# Patient Record
Sex: FEMALE | Race: WHITE | Employment: OTHER | URBAN - METROPOLITAN AREA
[De-identification: names, ages, dates, MRNs, and addresses within clinical notes are randomized per-mention and may not be internally consistent; named-entity substitution may affect disease eponyms.]

---

## 2017-01-23 PROBLEM — E66.01 MORBID OBESITY WITH BMI OF 40.0-44.9, ADULT (HCC): Status: ACTIVE | Noted: 2017-01-23

## 2017-02-03 PROBLEM — M21.379 FOOT DROP, UNSPECIFIED FOOT: Status: ACTIVE | Noted: 2017-02-03

## 2017-04-21 PROCEDURE — 83970 ASSAY OF PARATHORMONE: CPT | Performed by: INTERNAL MEDICINE

## 2017-04-24 ENCOUNTER — LAB ENCOUNTER (OUTPATIENT)
Dept: LAB | Facility: HOSPITAL | Age: 67
End: 2017-04-24
Attending: UROLOGY
Payer: MEDICARE

## 2017-04-24 DIAGNOSIS — R32 UNSPECIFIED URINARY INCONTINENCE: Primary | ICD-10-CM

## 2017-04-24 PROCEDURE — 81001 URINALYSIS AUTO W/SCOPE: CPT | Performed by: UROLOGY

## 2017-04-24 PROCEDURE — 87086 URINE CULTURE/COLONY COUNT: CPT

## 2017-04-24 PROCEDURE — 87088 URINE BACTERIA CULTURE: CPT

## 2017-04-24 PROCEDURE — 87186 SC STD MICRODIL/AGAR DIL: CPT

## 2017-06-05 PROCEDURE — 87086 URINE CULTURE/COLONY COUNT: CPT | Performed by: UROLOGY

## 2018-04-12 PROBLEM — D75.839 THROMBOCYTOSIS: Status: ACTIVE | Noted: 2018-04-12

## 2018-04-12 PROBLEM — I70.0 ATHEROSCLEROSIS OF AORTA: Status: ACTIVE | Noted: 2018-04-12

## 2018-04-12 PROBLEM — I77.1 TORTUOUS AORTA: Status: ACTIVE | Noted: 2018-04-12

## 2018-04-12 PROBLEM — E21.0 PRIMARY HYPERPARATHYROIDISM (HCC): Status: ACTIVE | Noted: 2018-04-12

## 2018-04-12 PROBLEM — I77.1 TORTUOUS AORTA (HCC): Status: ACTIVE | Noted: 2018-04-12

## 2018-04-12 PROBLEM — I70.0 ATHEROSCLEROSIS OF AORTA (HCC): Status: ACTIVE | Noted: 2018-04-12

## 2018-07-09 PROCEDURE — 81001 URINALYSIS AUTO W/SCOPE: CPT | Performed by: PHYSICIAN ASSISTANT

## 2018-07-09 PROCEDURE — 87086 URINE CULTURE/COLONY COUNT: CPT | Performed by: PHYSICIAN ASSISTANT

## 2018-08-20 PROCEDURE — 87086 URINE CULTURE/COLONY COUNT: CPT | Performed by: UROLOGY

## 2019-01-03 PROCEDURE — 88175 CYTOPATH C/V AUTO FLUID REDO: CPT | Performed by: INTERNAL MEDICINE

## 2019-01-07 PROCEDURE — 87077 CULTURE AEROBIC IDENTIFY: CPT | Performed by: PHYSICIAN ASSISTANT

## 2019-01-07 PROCEDURE — 81001 URINALYSIS AUTO W/SCOPE: CPT | Performed by: PHYSICIAN ASSISTANT

## 2019-01-07 PROCEDURE — 87086 URINE CULTURE/COLONY COUNT: CPT | Performed by: PHYSICIAN ASSISTANT

## 2019-01-07 PROCEDURE — 87186 SC STD MICRODIL/AGAR DIL: CPT | Performed by: PHYSICIAN ASSISTANT

## 2019-01-08 PROBLEM — E21.0 PRIMARY HYPERPARATHYROIDISM (HCC): Status: RESOLVED | Noted: 2018-04-12 | Resolved: 2019-01-08

## 2019-01-25 PROCEDURE — 81001 URINALYSIS AUTO W/SCOPE: CPT | Performed by: PHYSICIAN ASSISTANT

## 2019-03-15 PROCEDURE — 81001 URINALYSIS AUTO W/SCOPE: CPT | Performed by: UROLOGY

## 2019-03-28 PROCEDURE — 87086 URINE CULTURE/COLONY COUNT: CPT | Performed by: INTERNAL MEDICINE

## 2019-04-24 PROCEDURE — 81015 MICROSCOPIC EXAM OF URINE: CPT | Performed by: PHYSICIAN ASSISTANT

## 2019-06-12 PROCEDURE — 87077 CULTURE AEROBIC IDENTIFY: CPT | Performed by: UROLOGY

## 2019-06-12 PROCEDURE — 87186 SC STD MICRODIL/AGAR DIL: CPT | Performed by: UROLOGY

## 2019-06-12 PROCEDURE — 81015 MICROSCOPIC EXAM OF URINE: CPT | Performed by: UROLOGY

## 2019-06-12 PROCEDURE — 87086 URINE CULTURE/COLONY COUNT: CPT | Performed by: UROLOGY

## 2019-07-08 PROCEDURE — 87086 URINE CULTURE/COLONY COUNT: CPT | Performed by: UROLOGY

## 2019-07-08 PROCEDURE — 87186 SC STD MICRODIL/AGAR DIL: CPT | Performed by: UROLOGY

## 2019-07-08 PROCEDURE — 87077 CULTURE AEROBIC IDENTIFY: CPT | Performed by: UROLOGY

## 2019-07-08 PROCEDURE — 81001 URINALYSIS AUTO W/SCOPE: CPT | Performed by: UROLOGY

## 2019-07-23 PROCEDURE — 81001 URINALYSIS AUTO W/SCOPE: CPT | Performed by: UROLOGY

## 2019-07-23 PROCEDURE — 87086 URINE CULTURE/COLONY COUNT: CPT | Performed by: UROLOGY

## 2019-07-23 PROCEDURE — 87186 SC STD MICRODIL/AGAR DIL: CPT | Performed by: UROLOGY

## 2019-07-23 PROCEDURE — 87077 CULTURE AEROBIC IDENTIFY: CPT | Performed by: UROLOGY

## 2019-09-19 ENCOUNTER — ORDER TRANSCRIPTION (OUTPATIENT)
Dept: ADMINISTRATIVE | Facility: HOSPITAL | Age: 69
End: 2019-09-19

## 2019-09-19 DIAGNOSIS — K50.00 CROHN'S DISEASE OF SMALL INTESTINE WITHOUT COMPLICATION (HCC): Primary | ICD-10-CM

## 2019-09-23 ENCOUNTER — OFFICE VISIT (OUTPATIENT)
Dept: NUTRITION | Age: 69
End: 2019-09-23
Attending: INTERNAL MEDICINE
Payer: MEDICARE

## 2019-09-23 PROCEDURE — 97802 MEDICAL NUTRITION INDIV IN: CPT

## 2019-09-24 NOTE — PROGRESS NOTES
ADULT INITIAL OUTPATIENT NUTRITION CONSULTATION    Nutrition Assessment    Medical Diagnosis: Obesity, IBS    PMH: s/p lap band 2007, Crohn's Disease (controlled), HTN    Physical Findings: pleasant 69 y/o female, walks w/cane     Meds: noted    Labs: n/a via kendell MFP. Encouraged pt to monitor macros and calories as well, to determine daily needs. Pt aware of need to increase activity. Discussed following a strict FODMAP diet for 6 weeks, continuing to journal BMs and emotions.  Provided pt with list of foods

## 2019-10-07 ENCOUNTER — OFFICE VISIT (OUTPATIENT)
Dept: NUTRITION | Age: 69
End: 2019-10-07
Attending: INTERNAL MEDICINE
Payer: MEDICARE

## 2019-10-07 PROCEDURE — 97802 MEDICAL NUTRITION INDIV IN: CPT

## 2019-10-08 NOTE — PROGRESS NOTES
ADULT OUTPATIENT NUTRITION FOLLOW UP      Nutrition Assessment    Number of consultations with dietitian: 2nd visit    Ht:5'4\"  Wt: 265 lbs  Weight hx: 9/24/#   Weight change: LOSS of 6 lbs over 2 weeks    Estimated nutritional Needs: 5189-3722 kcal

## 2019-10-15 PROCEDURE — 88173 CYTOPATH EVAL FNA REPORT: CPT | Performed by: INTERNAL MEDICINE

## 2019-11-04 ENCOUNTER — OFFICE VISIT (OUTPATIENT)
Dept: NUTRITION | Age: 69
End: 2019-11-04
Attending: INTERNAL MEDICINE
Payer: MEDICARE

## 2019-11-04 PROCEDURE — 97802 MEDICAL NUTRITION INDIV IN: CPT

## 2019-11-04 NOTE — PROGRESS NOTES
ADULT OUTPATIENT NUTRITION FOLLOW UP      Nutrition Assessment     Number of consultations with dietitian: 3rd visit     Ht:5'4\"              Wt: 261 lbs  Weight hx: 9/24/#           10/7/#       *Weight change: LOSS of 10 lbs in 6 weeks*    contact RD with further questions or concerns.      GOALS:  1-continue to increase fiber: 25-30 gm/dy, lower sodium intake to <2000 mg/dy  2-continue FODMAP diet x 4 weeks  3-activity as able (balance exercises at home)  4-hypnotherapy to aid w/stress and

## 2020-01-06 ENCOUNTER — OFFICE VISIT (OUTPATIENT)
Dept: NUTRITION | Age: 70
End: 2020-01-06
Attending: INTERNAL MEDICINE
Payer: MEDICARE

## 2020-01-06 PROCEDURE — 97802 MEDICAL NUTRITION INDIV IN: CPT

## 2020-01-06 NOTE — PROGRESS NOTES
ADULT OUTPATIENT NUTRITION FOLLOW UP        Nutrition Assessment     Number of consultations with dietitian: 4th visit     Ht:5'4\"              Wt: 255 lbs    Weight hx: 9/24/#                      10/7/#         11/4/#      *Weight rosas 1200-1300k/dy. Activity encouraged. Pt agrees to continue to log intake/feelings/BMs, and follow FODMAP diet x 4 more weeks. Written materials reviewed, including web sites and recipes options. All questions answered today.  Pt to contact RD with further qu

## 2020-06-15 ENCOUNTER — APPOINTMENT (OUTPATIENT)
Dept: NUTRITION | Age: 70
End: 2020-06-15
Attending: NURSE PRACTITIONER
Payer: MEDICARE

## 2020-06-15 ENCOUNTER — TELEMEDICINE (OUTPATIENT)
Dept: NUTRITION | Age: 70
End: 2020-06-15
Payer: MEDICARE

## 2020-06-15 PROCEDURE — 97802 MEDICAL NUTRITION INDIV IN: CPT

## 2020-06-15 NOTE — PROGRESS NOTES
ADULT OUTPATIENT NUTRITION FOLLOW UP    *consultation completed via virtual video visit d/t COVID pandemic*    Nutrition Assessment    Number of consultations with dietitian: 5th visit    Ht: 5'4\"  Wt: 275 lbs    Weight Hx: 9/24/19 - 271#        10/7/19 - stress/anxiety, has tapes for home and sees Sharon Jay as needed. Discussed nutrient dense foods, increasing fiber gradually, monitoring protein and carb intake as well, and keeping herself accountable with MFP.  Pt has all written materials, and agrees to [employer]

## 2020-07-16 PROBLEM — E21.0 PRIMARY HYPERPARATHYROIDISM (HCC): Status: RESOLVED | Noted: 2018-04-12 | Resolved: 2020-07-16

## 2020-07-20 ENCOUNTER — WALK IN (OUTPATIENT)
Dept: URGENT CARE | Age: 70
End: 2020-07-20

## 2020-07-20 VITALS — TEMPERATURE: 99.1 F

## 2020-07-20 DIAGNOSIS — Z23 ENCOUNTER FOR IMMUNIZATION: Primary | ICD-10-CM

## 2020-07-20 PROCEDURE — 90471 IMMUNIZATION ADMIN: CPT | Performed by: NURSE PRACTITIONER

## 2020-07-20 PROCEDURE — 90750 HZV VACC RECOMBINANT IM: CPT | Performed by: NURSE PRACTITIONER

## 2020-07-20 RX ORDER — AZATHIOPRINE 50 MG/1
TABLET ORAL
COMMUNITY
Start: 2016-03-15

## 2020-07-20 RX ORDER — ACETAMINOPHEN 500 MG
500 TABLET ORAL
COMMUNITY

## 2020-07-20 RX ORDER — MULTIVITAMIN
1 TABLET ORAL
COMMUNITY

## 2020-07-20 RX ORDER — VALSARTAN 160 MG/1
TABLET ORAL
COMMUNITY
Start: 2020-07-16 | End: 2021-08-22 | Stop reason: ALTCHOICE

## 2020-07-20 RX ORDER — ALPRAZOLAM 0.25 MG/1
TABLET ORAL
COMMUNITY
Start: 2020-03-03

## 2020-07-20 RX ORDER — ACETAMINOPHEN 160 MG
2000 TABLET,DISINTEGRATING ORAL
COMMUNITY

## 2020-07-20 RX ORDER — PANTOPRAZOLE SODIUM 40 MG/1
TABLET, DELAYED RELEASE ORAL
COMMUNITY
Start: 2016-02-15

## 2020-07-20 RX ORDER — SOLIFENACIN SUCCINATE 10 MG/1
10 TABLET, FILM COATED ORAL
COMMUNITY
Start: 2020-02-14 | End: 2021-02-08

## 2020-07-30 ENCOUNTER — OFFICE VISIT (OUTPATIENT)
Dept: NUTRITION | Facility: HOSPITAL | Age: 70
End: 2020-07-30
Attending: INTERNAL MEDICINE
Payer: MEDICARE

## 2020-07-30 PROCEDURE — 97802 MEDICAL NUTRITION INDIV IN: CPT

## 2020-07-30 NOTE — PROGRESS NOTES
ADULT OUTPATIENT NUTRITION FOLLOW UP     Nutrition Assessment     Number of consultations with dietitian: 6th visit (today in person in [facility] office)     Ht: 5'4\"                         Wt: 277 lbs  BMI: 46     Weight Hx: 9/24/19 - 271# weight loss management, FODMAP diet guidelines and GI issue improvements. Symptoms have greatly reduced over past few months and pt has been more aggressive in trying new FODMAP food items.  Encouraged small amounts over 3 days with the 3 day washout period

## 2020-09-08 ENCOUNTER — TELEPHONE (OUTPATIENT)
Dept: SCHEDULING | Age: 70
End: 2020-09-08

## 2020-09-23 PROBLEM — N32.81 OAB (OVERACTIVE BLADDER): Status: ACTIVE | Noted: 2020-09-23

## 2020-10-05 ENCOUNTER — WALK IN (OUTPATIENT)
Dept: URGENT CARE | Age: 70
End: 2020-10-05

## 2020-10-05 VITALS — TEMPERATURE: 96.9 F

## 2020-10-05 DIAGNOSIS — Z23 ENCOUNTER FOR IMMUNIZATION: Primary | ICD-10-CM

## 2020-10-05 PROCEDURE — 90750 HZV VACC RECOMBINANT IM: CPT | Performed by: NURSE PRACTITIONER

## 2020-10-06 ENCOUNTER — TELEPHONE (OUTPATIENT)
Dept: SCHEDULING | Age: 70
End: 2020-10-06

## 2020-10-26 ENCOUNTER — TELEMEDICINE (OUTPATIENT)
Dept: NUTRITION | Age: 70
End: 2020-10-26
Payer: MEDICARE

## 2020-10-26 PROCEDURE — 97802 MEDICAL NUTRITION INDIV IN: CPT

## 2020-10-26 NOTE — PROGRESS NOTES
ADULT OUTPATIENT NUTRITION FOLLOW UP     Nutrition Assessment (completed via virtual visit)     Number of consultations with dietitian: 7th visit over past year     Ht: 5'4\"                         Wt: 278 lbs                    BMI: 47.7     Weight Hx: 9 new FODMAP food items. Encouraged small amounts over 3 days with the 3 day washout period, which pt has been following (strawberries, pineapple, melon, popcorn, beets, celery, cucumbers, spinach, ice cream, LF yogurt-doesn't like).  Pt plans to try sweet po EMRE  [facility]. [email or web address]  P: [phone]

## 2020-11-30 ENCOUNTER — TELEMEDICINE (OUTPATIENT)
Dept: NUTRITION | Age: 70
End: 2020-11-30
Payer: MEDICARE

## 2020-11-30 PROCEDURE — 97802 MEDICAL NUTRITION INDIV IN: CPT

## 2020-12-01 NOTE — PROGRESS NOTES
ADULT OUTPATIENT NUTRITION FOLLOW UP     Nutrition Assessment (completed via virtual visit)     Number of consultations with dietitian: 8th visit over past year     Ht: 5'4\"                         Wt: 278 lbs                    BMI: 47.7     Weight Hx: 9 cardio/wk. GOAL: 3 classes/wk complete before lunch (22 min or 45 min/dy), arm bands or weights on non-class days, hoping for PT in the future (pending COVID pandemic).     Diet Quality: Needs further improvement     Nutrition Intervention/Education: Compr MFP/symptom journal  3. Stress/anxiety self care and hypnotherapy  4. FODMAP diet continues, close portion control  5. increase water consumption/add high fiber/high protein breakfast daily     Monitoring/Evaluation: Pt to follow with MD, monitor labs, kali

## 2020-12-28 ENCOUNTER — TELEMEDICINE (OUTPATIENT)
Dept: NUTRITION | Age: 70
End: 2020-12-28
Payer: MEDICARE

## 2020-12-28 PROCEDURE — 97802 MEDICAL NUTRITION INDIV IN: CPT

## 2020-12-28 NOTE — PROGRESS NOTES
ADULT OUTPATIENT NUTRITION FOLLOW UP     Nutrition Assessment (completed via virtual visit)     Number of consultations with dietitian: 9th visit over past year     Ht: 5'4\"                         Wt: 278 lbs (no new wt today)                   BMI: 52. 7 consistent with the classes, but does enjoy them. Long term goal 5-6 x/wk. Encouraged steps to goal of 150 min of cardio/wk.  Even walking around the house, using PT bands and weights at home, etc.   GOAL: 3 classes/wk complete before lunch (22 min or 45 mi over the next several visits. Provided handouts on re-introduction guidelines for mannitol and lactose group as discussed. All questions answered at this time. Will add other groups as able/tolerated.  Provided further encouragement and support.        Esteban Beckett

## 2021-01-25 ENCOUNTER — TELEMEDICINE (OUTPATIENT)
Dept: NUTRITION | Age: 71
End: 2021-01-25
Attending: INTERNAL MEDICINE
Payer: MEDICARE

## 2021-01-25 PROCEDURE — 97802 MEDICAL NUTRITION INDIV IN: CPT

## 2021-01-25 NOTE — PROGRESS NOTES
ADULT OUTPATIENT NUTRITION FOLLOW UP     Nutrition Assessment (completed via virtual visit)     Number of consultations with dietitian: 10th visit over past year     Ht: 5'4\"                         Wt: 276 lbs                  BMI: 47.4  *LOSS of 1.5# ov on non-class days, hoping for PT in the future (pending COVID pandemic). RD feels PT will greatly help with balance, strength and confidence in the future.      Diet Quality: Needs further improvement     Nutrition Intervention/Education: Comprehensive nutr consumption/add high fiber/high protein breakfast daily     Monitoring/Evaluation: Pt to follow with MD next week, monitor labs, weight, meds, GI symptoms. Pt to follow with RD in 4-6 weeks, pt will call.      Thank you for the referral,     Patrica Dawson,

## 2021-03-18 PROBLEM — Z79.899 IMMUNODEFICIENCY DUE TO LONG TERM DRUG THERAPY (HCC): Status: ACTIVE | Noted: 2021-03-18

## 2021-03-18 PROBLEM — D84.821 IMMUNODEFICIENCY DUE TO LONG TERM DRUG THERAPY (HCC): Status: ACTIVE | Noted: 2021-03-18

## 2021-05-24 ENCOUNTER — DIETICIAN VISIT (OUTPATIENT)
Dept: NUTRITION | Age: 71
End: 2021-05-24
Attending: INTERNAL MEDICINE
Payer: MEDICARE

## 2021-05-24 PROCEDURE — 97802 MEDICAL NUTRITION INDIV IN: CPT

## 2021-05-25 NOTE — PROGRESS NOTES
ADULT OUTPATIENT NUTRITION FOLLOW UP    Nutrition Assessment    Number of consultations with dietitian: 11th visit over past year    Ht: 5'4\"  Wt: 281# BMI: 48.2 ADJ BW: 84kg  Wt has been stable past several months ~270-280#    Additional Labs since last to begin re-introducing more foods, as last visit with RD was 4 months ago. Pt reports feeling well today, having NO GI distress. \"A fresh start, the COVID winter was difficult\" Pt motivated to begin with new food trials moving forward.  Encouraged nutrie

## 2021-07-26 ENCOUNTER — OFFICE VISIT (OUTPATIENT)
Dept: NUTRITION | Age: 71
End: 2021-07-26
Attending: INTERNAL MEDICINE
Payer: MEDICARE

## 2021-07-26 PROCEDURE — 97802 MEDICAL NUTRITION INDIV IN: CPT

## 2021-07-26 NOTE — PROGRESS NOTES
ADULT OUTPATIENT NUTRITION FOLLOW UP    Progress Notes  Nonda JEREMY Roberts (Registered Dietitian) • • Dietitian     ADULT OUTPATIENT NUTRITION FOLLOW UP     Nutrition Assessment     Number of consultations with dietitian: 12th visit over past year     Ht: 5 months as discussed. Nutrition Intervention/Education: Comprehensive nutrition follow up education provided for weight loss management, FODMAP diet guidelines, and GI issue improvements. Pt reports feeling well today, having NO GI distress.  But is disa

## 2021-08-16 ENCOUNTER — OFFICE VISIT (OUTPATIENT)
Dept: FAMILY MEDICINE CLINIC | Facility: CLINIC | Age: 71
End: 2021-08-16
Payer: COMMERCIAL

## 2021-08-16 VITALS
SYSTOLIC BLOOD PRESSURE: 136 MMHG | TEMPERATURE: 100 F | OXYGEN SATURATION: 95 % | DIASTOLIC BLOOD PRESSURE: 90 MMHG | HEART RATE: 78 BPM | WEIGHT: 287 LBS | RESPIRATION RATE: 16 BRPM | HEIGHT: 64 IN | BODY MASS INDEX: 49 KG/M2

## 2021-08-16 DIAGNOSIS — Z20.822 SUSPECTED 2019 NOVEL CORONAVIRUS INFECTION: Primary | ICD-10-CM

## 2021-08-16 PROCEDURE — 3008F BODY MASS INDEX DOCD: CPT | Performed by: NURSE PRACTITIONER

## 2021-08-16 PROCEDURE — 3080F DIAST BP >= 90 MM HG: CPT | Performed by: NURSE PRACTITIONER

## 2021-08-16 PROCEDURE — 3075F SYST BP GE 130 - 139MM HG: CPT | Performed by: NURSE PRACTITIONER

## 2021-08-16 PROCEDURE — 99203 OFFICE O/P NEW LOW 30 MIN: CPT | Performed by: NURSE PRACTITIONER

## 2021-08-16 NOTE — PROGRESS NOTES
CHIEF COMPLAINT:   Patient presents with:  Covid-19 Test      HPI:   Pallavi Barrientos is a 66year old female who presents for wants a covid test. 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

## 2021-08-18 LAB — SARS-COV-2 RNA RESP QL NAA+PROBE: NOT DETECTED

## 2021-08-22 ENCOUNTER — OFFICE VISIT (OUTPATIENT)
Dept: URGENT CARE | Age: 71
End: 2021-08-22

## 2021-08-22 VITALS
SYSTOLIC BLOOD PRESSURE: 128 MMHG | TEMPERATURE: 98.4 F | DIASTOLIC BLOOD PRESSURE: 86 MMHG | HEIGHT: 64 IN | BODY MASS INDEX: 47.97 KG/M2 | OXYGEN SATURATION: 95 % | WEIGHT: 281 LBS | HEART RATE: 84 BPM | RESPIRATION RATE: 18 BRPM

## 2021-08-22 DIAGNOSIS — J01.10 ACUTE NON-RECURRENT FRONTAL SINUSITIS: Primary | ICD-10-CM

## 2021-08-22 LAB
INTERNAL PROCEDURAL CONTROLS ACCEPTABLE: YES
S PYO AG THROAT QL IA.RAPID: NEGATIVE

## 2021-08-22 PROCEDURE — 99213 OFFICE O/P EST LOW 20 MIN: CPT | Performed by: NURSE PRACTITIONER

## 2021-08-22 PROCEDURE — 87880 STREP A ASSAY W/OPTIC: CPT | Performed by: NURSE PRACTITIONER

## 2021-08-22 RX ORDER — VALSARTAN 160 MG/1
160 TABLET ORAL DAILY
COMMUNITY

## 2021-08-22 RX ORDER — ALPRAZOLAM 0.25 MG/1
TABLET ORAL
COMMUNITY
Start: 2020-10-22

## 2021-08-22 RX ORDER — AZITHROMYCIN 250 MG/1
TABLET, FILM COATED ORAL
Qty: 6 TABLET | Refills: 0 | Status: SHIPPED | OUTPATIENT
Start: 2021-08-22

## 2021-08-22 RX ORDER — OXYBUTYNIN CHLORIDE 10 MG/1
TABLET, EXTENDED RELEASE ORAL
COMMUNITY

## 2021-08-22 RX ORDER — ERGOCALCIFEROL 1.25 MG/1
CAPSULE ORAL
COMMUNITY

## 2021-08-22 RX ORDER — SOLIFENACIN SUCCINATE 10 MG/1
TABLET, FILM COATED ORAL
COMMUNITY
Start: 2021-04-22

## 2021-08-22 RX ORDER — CYANOCOBALAMIN (VITAMIN B-12) 500 MCG
TABLET ORAL
COMMUNITY

## 2021-08-22 ASSESSMENT — ENCOUNTER SYMPTOMS
STRIDOR: 0
WHEEZING: 0
SORE THROAT: 1
COUGH: 1
SHORTNESS OF BREATH: 1

## 2021-09-21 ENCOUNTER — ORDER TRANSCRIPTION (OUTPATIENT)
Dept: ADMINISTRATIVE | Facility: HOSPITAL | Age: 71
End: 2021-09-21

## 2021-09-21 DIAGNOSIS — K50.00 CROHN'S DISEASE OF SMALL INTESTINE WITHOUT COMPLICATION (HCC): Primary | ICD-10-CM

## 2021-10-18 ENCOUNTER — OFFICE VISIT (OUTPATIENT)
Dept: NUTRITION | Age: 71
End: 2021-10-18
Attending: INTERNAL MEDICINE
Payer: MEDICARE

## 2021-10-18 PROCEDURE — 97802 MEDICAL NUTRITION INDIV IN: CPT

## 2021-10-18 NOTE — PROGRESS NOTES
ADULT OUTPATIENT NUTRITION FOLLOW UP     Nutrition Assessment     Number of consultations with dietitian: 7th visit over this past year     Ht: 5'4\"                         Wt: ~267# (as per pt)               ADJ BW: 84kg  Recent wt hx: 9/13/#  *l related to increased snacking as evidenced by limited weight loss over past several months.     Nutrition Intervention/Education: Comprehensive nutrition follow up education provided for weight loss management, FODMAP diet guidelines, and GI issue improvem eating  4-nutrient dense food choices/portion control     Monitoring/Evaluation: Pt to follow with MD prn (multiple UTI issues as noted, no plans for Botox as Antibiotics interfere w/eating pattern and motivation).  Follow up with RD schedule for 2 months:

## 2021-11-04 PROBLEM — N39.0 RECURRENT UTI: Status: ACTIVE | Noted: 2021-11-04

## 2022-03-14 PROBLEM — D47.3 ESSENTIAL (HEMORRHAGIC) THROMBOCYTHEMIA (HCC): Status: ACTIVE | Noted: 2022-03-14

## 2022-03-21 ENCOUNTER — DIETICIAN VISIT (OUTPATIENT)
Dept: NUTRITION | Age: 72
End: 2022-03-21
Attending: INTERNAL MEDICINE
Payer: MEDICARE

## 2022-03-21 PROCEDURE — 97802 MEDICAL NUTRITION INDIV IN: CPT

## 2022-06-28 ENCOUNTER — OFFICE VISIT (OUTPATIENT)
Dept: UROLOGY | Facility: CLINIC | Age: 72
End: 2022-06-28
Attending: OBSTETRICS & GYNECOLOGY
Payer: MEDICARE

## 2022-06-28 VITALS
HEIGHT: 64 IN | WEIGHT: 290 LBS | DIASTOLIC BLOOD PRESSURE: 111 MMHG | TEMPERATURE: 98 F | SYSTOLIC BLOOD PRESSURE: 157 MMHG | BODY MASS INDEX: 49.51 KG/M2

## 2022-06-28 DIAGNOSIS — N81.84 PELVIC MUSCLE WASTING: ICD-10-CM

## 2022-06-28 DIAGNOSIS — R35.1 NOCTURIA: ICD-10-CM

## 2022-06-28 DIAGNOSIS — N95.2 POSTMENOPAUSAL ATROPHIC VAGINITIS: ICD-10-CM

## 2022-06-28 DIAGNOSIS — N39.41 URGE INCONTINENCE: Primary | ICD-10-CM

## 2022-06-28 LAB
BILIRUB UR QL STRIP.AUTO: NEGATIVE
CLARITY UR REFRACT.AUTO: CLEAR
COLOR UR AUTO: YELLOW
CONTROL RUN WITHIN 24 HOURS?: YES
GLUCOSE UR STRIP.AUTO-MCNC: NEGATIVE MG/DL
KETONES UR STRIP.AUTO-MCNC: NEGATIVE MG/DL
LEUKOCYTE ESTERASE UR QL STRIP.AUTO: NEGATIVE
LEUKOCYTE ESTERASE URINE: NEGATIVE
NITRITE UR QL STRIP.AUTO: NEGATIVE
NITRITE URINE: NEGATIVE
PH UR STRIP.AUTO: 6 [PH] (ref 5–8)
PROT UR STRIP.AUTO-MCNC: NEGATIVE MG/DL
SP GR UR STRIP.AUTO: 1.01 (ref 1–1.03)
UROBILINOGEN UR STRIP.AUTO-MCNC: <2 MG/DL

## 2022-06-28 PROCEDURE — 81001 URINALYSIS AUTO W/SCOPE: CPT | Performed by: OBSTETRICS & GYNECOLOGY

## 2022-06-28 PROCEDURE — 81002 URINALYSIS NONAUTO W/O SCOPE: CPT | Performed by: OBSTETRICS & GYNECOLOGY

## 2022-06-28 PROCEDURE — 87086 URINE CULTURE/COLONY COUNT: CPT | Performed by: OBSTETRICS & GYNECOLOGY

## 2022-06-28 PROCEDURE — 51701 INSERT BLADDER CATHETER: CPT | Performed by: OBSTETRICS & GYNECOLOGY

## 2022-06-28 PROCEDURE — 87186 SC STD MICRODIL/AGAR DIL: CPT | Performed by: OBSTETRICS & GYNECOLOGY

## 2022-06-28 PROCEDURE — 99212 OFFICE O/P EST SF 10 MIN: CPT

## 2022-06-28 PROCEDURE — 87077 CULTURE AEROBIC IDENTIFY: CPT | Performed by: OBSTETRICS & GYNECOLOGY

## 2022-06-28 RX ORDER — FUROSEMIDE 20 MG/1
20 TABLET ORAL 2 TIMES DAILY
COMMUNITY

## 2022-06-28 RX ORDER — ESTRADIOL 0.1 MG/G
CREAM VAGINAL
Qty: 42 G | Refills: 3 | Status: SHIPPED | OUTPATIENT
Start: 2022-06-28

## 2022-07-01 ENCOUNTER — TELEPHONE (OUTPATIENT)
Dept: UROLOGY | Facility: CLINIC | Age: 72
End: 2022-07-01

## 2022-07-01 DIAGNOSIS — N30.00 ACUTE CYSTITIS WITHOUT HEMATURIA: Primary | ICD-10-CM

## 2022-07-01 RX ORDER — VIBEGRON 75 MG/1
75 TABLET, FILM COATED ORAL DAILY
Qty: 90 TABLET | Refills: 3 | Status: SHIPPED | OUTPATIENT
Start: 2022-07-01 | End: 2022-07-31

## 2022-07-01 RX ORDER — CEPHALEXIN 500 MG/1
500 CAPSULE ORAL 4 TIMES DAILY
Qty: 14 CAPSULE | Refills: 0 | Status: SHIPPED | OUTPATIENT
Start: 2022-07-01

## 2022-07-01 RX ORDER — CEPHALEXIN 500 MG/1
500 CAPSULE ORAL 2 TIMES DAILY
Qty: 14 CAPSULE | Refills: 0 | Status: SHIPPED | OUTPATIENT
Start: 2022-07-01 | End: 2022-07-08

## 2022-07-01 NOTE — TELEPHONE ENCOUNTER
TC to pt w/ ucx results. Per Jacqueline Casas PA-\"Keflex 500mg bid x 7 days. There is a questionable asx bacteriuria component, so plan to test urine only with symptoms, but lets treat this one. Amoxicillin allergy, says she thinks she can take keflex. Let me know if there are questions. \"  Dicussed above w/ pt. Pt verbalizes an understanding. Pt reports she is also needing a refill of her gemtesa 75mg. Tomasz refill. Pt made appt for PT.  F/u w/ Dr Muhammad Pro 10/4

## 2022-07-26 ENCOUNTER — TELEPHONE (OUTPATIENT)
Dept: UROLOGY | Facility: CLINIC | Age: 72
End: 2022-07-26

## 2022-08-10 ENCOUNTER — TELEPHONE (OUTPATIENT)
Dept: PHYSICAL THERAPY | Facility: HOSPITAL | Age: 72
End: 2022-08-10

## 2022-08-12 ENCOUNTER — OFFICE VISIT (OUTPATIENT)
Dept: PHYSICAL THERAPY | Age: 72
End: 2022-08-12
Attending: OBSTETRICS & GYNECOLOGY
Payer: MEDICARE

## 2022-08-12 PROCEDURE — 97112 NEUROMUSCULAR REEDUCATION: CPT

## 2022-08-12 PROCEDURE — 97535 SELF CARE MNGMENT TRAINING: CPT

## 2022-08-12 PROCEDURE — 97163 PT EVAL HIGH COMPLEX 45 MIN: CPT

## 2022-08-12 NOTE — PROGRESS NOTES
MUSCULOSKELETAL AND PELVIC FLOOR EVALUATION:     Referring Physician: Dr. Norman Pratt  Diagnosis: Urge incontinence (N39.41)  Nocturia (R35.1)       Date of Service: 2022     PATIENT Heath Wilson is a 74year old y/o female who presents to therapy today with complaints of Urge Incontince post Lumbar Fusion L3-5 with  R Foot drop. and COVID had difficulty attending Orthopedic PT due to Urinary Urgency. Patient had 10-12 treatments of Posterior Tibialis Nerve stimulation,  Botox to Bladder x3, and Frequent UTIs  With course of  Antibiotics and Gemtesa. Patient can have Clitoral stimulation with Orgasm without leakage. BM 3x/ day with Pierce stool #5 marbles and denies Fecal Incontince. Chron's disease aggravate with Anxiety and seeing Psychologist.  Urination every 2-2.5 hours but leakage with walking to bathroom with Urge  Incontince changing pad (#5 & #6)  8-10x/ day with layers; and Nocturia 2x/ night. Current symptoms include: abdominal pain, urgency/frequency, pressure, incomplete emptying, post void dribble, leakage and leaking at night    Pt describes Cecum  pain level: current 1-2/10, best 0/10, worst 6-7/10. Quality: sharp    Pregnant Now: No  Obstetrical/Gynecological history: No obstetric history on file. : 0  Para: 0  Urodynamic Test: No  Manometry: No  Occupation/Activities: Retired [employer] [address]   LMP at age 62    PFDI 21    Scores   POPDI 6:    25   CRAD 8:    34.38   FLY 6:    54.17   Summary:    113.55           Raya Floyd describes prior level of function: Urge Incontince stared after Lumbar Fusion. Pt goals include \"To Live my Life without worrying about Incontince. \"      Past medical history was reviewed with Raya Floyd. Significant findings include  has a past medical history of Acosta's esophagus, Crohn's disease ([address] Foot drop, right, High blood pressure, High cholesterol, OAB (overactive bladder), and Obesity.     She has no past medical history of Breast cancer ([facility] [address] or Breast injury. Precautions:  Visual Impairment    URINARY HABITS  Types of symptoms: urge incontinence, incomplete emptying and nocturia  Events associated with the onset of urinary complaints: Post Fusion  Abdominal/Vaginal Pressure complaints: Yes  Urinary Frequency: 7x/ day and 2x/ night  Episodes of Leakage: 9-12 times per day  Pad Change frequency: 8-10  Nocturia: 2      BOWEL HABITS  Types of symptoms: other Chron's disease   Frequency of bowel movements: 3  Stool consistency: Juab Stool Scale: 5  Do you strain with defecation: No   Laxative use: No    SEXUAL HEALTH STATUS  Marinoff Scale: 0 Able to use Vibrator for clitoral Stimulation with Orgasm  History of Sexual Abuse: No  Sexual [place] Status: N/a  Pain with initial and/or deep penetration: N/A  Pain with pelvic exam/tampon use: No    ASSESSMENT  Ms. Zenaida Alex is a 74year old y/o Retired  who presents to therapy today with complaints of Urge Incontince which started two years post Lumbar Fusion of L3-5 resulting in R Foot drop and Sacral nerve involvement. Leoan Piedra had difficulty attending Orthopedic PT due to Urinary Urgency and COVID.) She had 10-12 treatments of Posterior Tibialis Nerve stimulation; Botox to Bladder x3; and complaints of Frequent UTIs. She was prescribed course of  Antibiotics and Gemtesa. She Urinates every 2-2.5 hours but has leakage with walking to bathroom with Urge  Incontince changing pad (#5 & #6 pads)  8-10x/ day with layers; and Nocturia 2x/ night. She reports Bowel Movement 3x/ day with Juab stool #5 marbles and denies Fecal Incontince with history of Chron's disease aggravated with Anxiety and seeing Psychologist.    Furthermore, Maria Esther Dubois can have Orgasm with Clitoral stimulation  without leakage or pain. The results of the objective tests and measures show R ASIS higher than L;  Increased Lordosis; Lumbar scars form L3-5 Fusion;  R Foot Drop with L Straight Cane; R UAQ scar adhesion to Transverse Colon;  L UAQ scar; Cecum pain with palpation with Chron's irritation; 2/5 Transverse Abdominis strength; Lumbar Paraspinal STRs; Scars form Epigastric Hernia Repair and Stomach Banding; R Hip 3/5 musculature; -3/5 R Glut Medius; 3/5 R  Knee Musculature;  R Dorsi flexion 0/5, R Plantar Flexion 1/5; Transverse Abdominis 2/5 weakness; Post Menopausal Change; Urethral meatus Hypomobile; Introitus redness; Vestibule& Anal [place]: hypo bilateral; 1/5 Levator Ani Strength with 2 count endurance; 1/5 External Anal sphincter Strength with gluteal substitution; And Moderate Anterior/ Posterior Vaginal wall Descent with valsalva. .      Functional deficits include but are not limited to abdominal pain, urgency/frequency, pressure, incomplete emptying, post void dribble, leakage and leaking at night with a Pelvic Floor Distress Inventory of 113.55 /300. Signs and symptoms are consistent with diagnosis of Urge incontinence (N39.41) and Nocturia (R35.1). Pt and PT discussed evaluation findings, pathology, POC and HEP. Pt voiced understanding and performs HEP correctly without reported pain. Skilled Pelvic Physical Therapy is medically necessary to address the above impairments and reach functional goals. OBJECTIVE:   Posture: R Hand Dominant; R ASIS higher than L; Increased Lordosis; Lumbar scars form L3-5 Fusion;  R Foot Drop with L Straight Cane; R UAQ scar adhesion to Transverse Colon;  L UAQ scar; Cecum pain with palpation with Chron's irritation; 2/5 Transverse Abdominis strength; Lumbar Paraspinal STRs  Pelvic Alignment: See Above  Gait: pt ambulates on level ground with assistive device of L Straight Cane.      External Palpation: Lumbar Paraspinal STRs  Scars (location/surgery): See Above  Abdominal Wall Integrity: Epigastric Hernia Repair and Stomach Banding  Diastasis Recti: (finger width depth while contracted)  Umbilicus: 0    Range Of Motion  Lumbar AROM screen: Limited due to fusion  LE AROM screen: grossly Limited due to Fusion    Strength (MMT) 5/5 DWAYNE LE except R Hip 3/5 musculature; -3/5 Glut Medius; 3/5 Knee Musculature;  Dorsi flexion 0/5, Plantar Flexion 1/5  Transverse Abdominis: 2/5    Flexibility Summary: WFL but limited    Orthopedic Summary  R ASIS higher than L; Increased Lordosis; Lumbar scars form L3-5 Fusion;  R Foot Drop with L Straight Cane; R UAQ scar adhesion to Transverse Colon;  L UAQ scar; Cecum pain with palpation with Chron's irritation; 2/5 Transverse Abdominis strength; Lumbar Paraspinal STRs; Scars form Epigastric Hernia Repair and Stomach Banding; R Hip 3/5 musculature; -3/5 Glut Medius; 3/5 Knee Musculature;  Dorsi flexion 0/5, Plantar Flexion 1/5  Transverse Abdominis: 2/5    Informed verbal consent for internal pelvic evaluation given: Yes    External Observation:   Voluntary contraction: present   Voluntary relaxation: present  Involuntary contraction: absent  Involuntary relaxation: absent    Post menopausal Change: Mons pubis:  Labia majora  Labia minora    Urethral meatus: other: Hypomobile  Introitus: redness  Perineal body: WNL    Sensory/Reflex:  Vestibule: hypo bilateral  Anal [place]: hypo bilateral    Internal Examination   Scar: N/A    Pelvic Floor Muscle strength: (PERF= Power/Endurance/Reps/Fast) MMT: 1/2/3  External Anal Sphincter: 1/5  Accessory Muscle Use: gluteals    Tissue Laxity Test:  Anterior Wall: Mod  Posterior Wall: Mod  Apical: Min    Internal Palpation: [facility]    Internal Vaginal and/ or Rectal Summary: Post Menopausal Change; Urethral meatus: other: Hypomobile; Introitus: redness; Vestibule& Anal [place]: hypo bilateral; 1/5 Levator Ani Strength with 2 count endurance; 1/5 External Anal sphincter Strength with gluteal substitution;  and Moderate Anterior/ Posterior Vaginal wall Descent with valsalva.     Today's Treatment and Response:   Patient education was provided on objective findings of external and internal evaluation and expectations with treatment outcomes. Patient was educated on and issued a HEP for:  instructed in bladder, bowel, and diet diary log and issued handout , diaphragmatic breathing for PNS activation and pelvic floor relaxation  and coordination of diaphragmatic breathing and pelvic floor contraction     Charges: PT Brittanyal High Complexity, 1SC, 2NM      Total Timed Treatment: 45 min     Total Treatment Time: 85 min     Based on clinical rationale and outcome measures, this evaluation involved High Complexity decision making due to 3+ personal factors/co morbidities, 3 body structures involved/activity limitations, and unstable symptoms including urge urinary incontinence, pelvic organ prolapse and Urinary urgency with frequency. PLAN OF CARE:    Goals: (to be met in 12 visits)    STG 4-6 visits  Patient instructed on bladder/ bowel diary, and fluid/ food intake diary for 3 days. Patient instructed on bladder irritants, increased water intake to 64 ounces /day, and increased fiber intake to 25g/ day for bowel/ bladder health. Patient instructed on use of step stool to allow for defecation without straining. Patient instructed on diaphragmatic breathing for parasympathetic nervous stimulation and to allow for increased intra abdominal pressure with defecation. Patient instructed on Levator Ani/ Transverse Abdominis (Pelvic Brace) contraction to prevent Urinary incontinence with ADLs. Patient exhibits an increase in myofascial pelvic floor soft tissue mobility allowing for urination and defecation without painful straining. LTG 6-12 visits    Patient instructed on Levator Ani contraction inverse to diaphragmatic breathing to allow for pelvic brace with ADLs without valsalva. Patient exhibits an increase in Levator Ani/ Transverse abdominis strength from 1-2/5 with 3 count hold to 3/5  with 10 count hold to allow for pelvic brace with ADLs.     Patient exhibits an increased in hip strength from -3/5 to 4/5 to allow for ambulation. Patient reports a reduction in UI from 8-10x/ day to 2-3x/ day resulting in decrease pad use from 2-3/ day. Patient reports change in [place]man stool #5 to #4 with daily bowel moment without straining and prevention of further pelvic organ prolapse. Patient understands importance of performing HEP to prevent reoccurrence of symptoms. Pt goals include \"To Live my Life without worrying about Incontince. \"    Frequency / Duration: Patient will be seen for 1-2 x/week or a total of 12 visits over a 90 day period. Treatment will include: Neuromuscular re-education, including use of biofeedback to aid in pelvic floor muscle retraining (down training, up training, isolation, and coordination); Manual therapy: soft tissue and joint mobilizations to restore normal joint mechanics, improve pelvic floor muscle and tissue mobility, and decrease pain; Therapeutic exercises including ROM, strengthening; lumbo pelvic strengthening and stabilization training, stretching program; Pt. education for bladder/bowel health, neuroscience principles for pelvic floor rehab, bladder retraining, posture and body mechanics, Functional integration of pelvic floor muscle exercises, Modalities as needed (including ultrasound and e-stimulation), HEP instruction, Dry Needling, and progression. Education or treatment limitation: None  Rehab Potential:good      Patient/Family/Caregiver was advised of these findings, precautions, and treatment options and has agreed to actively participate in planning and for this course of care. Thank you for your referral. Please co-sign or sign and return this letter via fax as soon as possible to [phone]. If you have any questions, please contact me at Dept: [phone]  Sincerely,  Electronically signed by therapist: Aida Garcia.  Adolfo, PT, MPT, PRPC    Physician's certification required: Yes  I certify the need for these services furnished under this plan of treatment and while under my care.     X___________________________________________________ Date____________________    Certification From: 2/80/3427  To:11/10/2022

## 2022-08-15 ENCOUNTER — APPOINTMENT (OUTPATIENT)
Dept: PHYSICAL THERAPY | Age: 72
End: 2022-08-15
Attending: OBSTETRICS & GYNECOLOGY
Payer: MEDICARE

## 2022-08-22 ENCOUNTER — OFFICE VISIT (OUTPATIENT)
Dept: PHYSICAL THERAPY | Age: 72
End: 2022-08-22
Attending: OBSTETRICS & GYNECOLOGY
Payer: MEDICARE

## 2022-08-22 ENCOUNTER — TELEPHONE (OUTPATIENT)
Dept: PHYSICAL THERAPY | Facility: HOSPITAL | Age: 72
End: 2022-08-22

## 2022-08-22 DIAGNOSIS — N39.41 URGE INCONTINENCE: ICD-10-CM

## 2022-08-22 DIAGNOSIS — R35.1 NOCTURIA: ICD-10-CM

## 2022-08-22 PROCEDURE — 97116 GAIT TRAINING THERAPY: CPT

## 2022-08-22 PROCEDURE — 97535 SELF CARE MNGMENT TRAINING: CPT

## 2022-08-22 PROCEDURE — 97110 THERAPEUTIC EXERCISES: CPT

## 2022-08-24 ENCOUNTER — APPOINTMENT (OUTPATIENT)
Dept: PHYSICAL THERAPY | Age: 72
End: 2022-08-24
Attending: OBSTETRICS & GYNECOLOGY
Payer: MEDICARE

## 2022-08-29 ENCOUNTER — APPOINTMENT (OUTPATIENT)
Dept: PHYSICAL THERAPY | Age: 72
End: 2022-08-29
Attending: OBSTETRICS & GYNECOLOGY
Payer: MEDICARE

## 2022-08-29 ENCOUNTER — TELEPHONE (OUTPATIENT)
Dept: PHYSICAL THERAPY | Facility: HOSPITAL | Age: 72
End: 2022-08-29

## 2022-08-31 ENCOUNTER — OFFICE VISIT (OUTPATIENT)
Dept: PHYSICAL THERAPY | Age: 72
End: 2022-08-31
Attending: OBSTETRICS & GYNECOLOGY
Payer: MEDICARE

## 2022-08-31 PROCEDURE — 97112 NEUROMUSCULAR REEDUCATION: CPT

## 2022-08-31 PROCEDURE — 97110 THERAPEUTIC EXERCISES: CPT

## 2022-09-06 ENCOUNTER — TELEPHONE (OUTPATIENT)
Dept: PHYSICAL THERAPY | Facility: HOSPITAL | Age: 72
End: 2022-09-06

## 2022-09-07 ENCOUNTER — APPOINTMENT (OUTPATIENT)
Dept: PHYSICAL THERAPY | Age: 72
End: 2022-09-07
Attending: OBSTETRICS & GYNECOLOGY
Payer: MEDICARE

## 2022-09-09 ENCOUNTER — APPOINTMENT (OUTPATIENT)
Dept: PHYSICAL THERAPY | Age: 72
End: 2022-09-09
Attending: OBSTETRICS & GYNECOLOGY
Payer: MEDICARE

## 2022-09-12 ENCOUNTER — TELEPHONE (OUTPATIENT)
Dept: PHYSICAL THERAPY | Age: 72
End: 2022-09-12

## 2022-09-12 ENCOUNTER — TELEPHONE (OUTPATIENT)
Dept: PHYSICAL THERAPY | Facility: HOSPITAL | Age: 72
End: 2022-09-12

## 2022-09-12 ENCOUNTER — APPOINTMENT (OUTPATIENT)
Dept: PHYSICAL THERAPY | Age: 72
End: 2022-09-12
Attending: OBSTETRICS & GYNECOLOGY
Payer: MEDICARE

## 2022-09-19 ENCOUNTER — APPOINTMENT (OUTPATIENT)
Dept: PHYSICAL THERAPY | Age: 72
End: 2022-09-19
Attending: OBSTETRICS & GYNECOLOGY
Payer: MEDICARE

## 2022-09-19 ENCOUNTER — TELEPHONE (OUTPATIENT)
Dept: PHYSICAL THERAPY | Age: 72
End: 2022-09-19

## 2022-09-26 ENCOUNTER — OFFICE VISIT (OUTPATIENT)
Dept: PHYSICAL THERAPY | Age: 72
End: 2022-09-26
Attending: OBSTETRICS & GYNECOLOGY
Payer: MEDICARE

## 2022-09-26 ENCOUNTER — TELEPHONE (OUTPATIENT)
Dept: PHYSICAL THERAPY | Facility: HOSPITAL | Age: 72
End: 2022-09-26

## 2022-09-26 DIAGNOSIS — N39.41 URGE INCONTINENCE: ICD-10-CM

## 2022-09-26 DIAGNOSIS — R35.1 NOCTURIA: ICD-10-CM

## 2022-09-26 PROCEDURE — 97112 NEUROMUSCULAR REEDUCATION: CPT

## 2022-09-26 PROCEDURE — 97110 THERAPEUTIC EXERCISES: CPT

## 2022-09-26 PROCEDURE — 97535 SELF CARE MNGMENT TRAINING: CPT

## 2022-10-03 ENCOUNTER — OFFICE VISIT (OUTPATIENT)
Dept: PHYSICAL THERAPY | Age: 72
End: 2022-10-03
Attending: OBSTETRICS & GYNECOLOGY
Payer: MEDICARE

## 2022-10-03 DIAGNOSIS — R35.1 NOCTURIA: ICD-10-CM

## 2022-10-03 DIAGNOSIS — N39.41 URGE INCONTINENCE: ICD-10-CM

## 2022-10-03 PROCEDURE — 97535 SELF CARE MNGMENT TRAINING: CPT

## 2022-10-05 ENCOUNTER — TELEPHONE (OUTPATIENT)
Dept: UROLOGY | Facility: CLINIC | Age: 72
End: 2022-10-05

## 2022-10-05 NOTE — TELEPHONE ENCOUNTER
Patient called and left a detailed message on nursing line. Detailed message left on nursing line suggested that pelvic floor physical therapy has been stopped and would like to speak with a nurse. Returned patients call. No answer. Left a detailed message for patient to call office. Office number provided at [phone].

## 2022-10-06 NOTE — TELEPHONE ENCOUNTER
TC from pt w/condition update. Pt saw Martin Walls for first time 6/2022 for UUI, nocturia. She was already taking Gemtesa from previous uro(Susi) and we started her on estrace cream, and ordered PT. Pt completed 5 sessions of PT by 8/31/22 and developed Crohn's flare for which she is still fighting, but is much better. Pt has appt w/GI in November for colonoscopy and exam. Plan is for pt to focus on her GI issues and she will cont PT after she has GI eval. She will cont Gemtesa, estrace. She states she was seeing improvement w/leakage and urgency after the 5 sessions and will cont the exercises she learned. She will call our office back for further scheduling and for any questions/concerns.

## 2022-10-10 ENCOUNTER — APPOINTMENT (OUTPATIENT)
Dept: PHYSICAL THERAPY | Age: 72
End: 2022-10-10
Attending: OBSTETRICS & GYNECOLOGY
Payer: MEDICARE

## 2022-10-17 ENCOUNTER — APPOINTMENT (OUTPATIENT)
Dept: PHYSICAL THERAPY | Age: 72
End: 2022-10-17
Attending: OBSTETRICS & GYNECOLOGY
Payer: MEDICARE

## 2022-11-02 ENCOUNTER — APPOINTMENT (OUTPATIENT)
Dept: PHYSICAL THERAPY | Age: 72
End: 2022-11-02
Attending: INTERNAL MEDICINE
Payer: MEDICARE

## 2023-02-21 ENCOUNTER — OFFICE VISIT (OUTPATIENT)
Dept: FAMILY MEDICINE CLINIC | Facility: CLINIC | Age: 73
End: 2023-02-21
Payer: MEDICARE

## 2023-02-21 VITALS
OXYGEN SATURATION: 97 % | RESPIRATION RATE: 18 BRPM | HEART RATE: 85 BPM | SYSTOLIC BLOOD PRESSURE: 140 MMHG | BODY MASS INDEX: 48 KG/M2 | DIASTOLIC BLOOD PRESSURE: 80 MMHG | WEIGHT: 282 LBS | TEMPERATURE: 97 F

## 2023-02-21 DIAGNOSIS — M70.21 OLECRANON BURSITIS OF RIGHT ELBOW: Primary | ICD-10-CM

## 2023-02-21 PROCEDURE — 99213 OFFICE O/P EST LOW 20 MIN: CPT | Performed by: NURSE PRACTITIONER

## 2023-05-16 ENCOUNTER — OFFICE VISIT (OUTPATIENT)
Dept: FAMILY MEDICINE CLINIC | Facility: CLINIC | Age: 73
End: 2023-05-16
Payer: MEDICARE

## 2023-05-16 VITALS
RESPIRATION RATE: 18 BRPM | HEART RATE: 85 BPM | DIASTOLIC BLOOD PRESSURE: 88 MMHG | SYSTOLIC BLOOD PRESSURE: 125 MMHG | OXYGEN SATURATION: 96 % | TEMPERATURE: 97 F

## 2023-05-16 DIAGNOSIS — S81.802A LEG WOUND, LEFT, INITIAL ENCOUNTER: Primary | ICD-10-CM

## 2023-05-16 PROCEDURE — 99213 OFFICE O/P EST LOW 20 MIN: CPT | Performed by: NURSE PRACTITIONER

## 2023-05-16 PROCEDURE — 1160F RVW MEDS BY RX/DR IN RCRD: CPT | Performed by: NURSE PRACTITIONER

## 2023-05-16 PROCEDURE — 3079F DIAST BP 80-89 MM HG: CPT | Performed by: NURSE PRACTITIONER

## 2023-05-16 PROCEDURE — 3074F SYST BP LT 130 MM HG: CPT | Performed by: NURSE PRACTITIONER

## 2023-05-16 PROCEDURE — 1159F MED LIST DOCD IN RCRD: CPT | Performed by: NURSE PRACTITIONER

## 2024-03-11 ENCOUNTER — APPOINTMENT (OUTPATIENT)
Dept: ADMINISTRATIVE | Facility: HOSPITAL | Age: 74
End: 2024-03-11
Payer: MEDICARE

## 2024-04-04 ENCOUNTER — ANESTHESIA (OUTPATIENT)
Dept: ENDOSCOPY | Facility: HOSPITAL | Age: 74
End: 2024-04-04
Payer: MEDICARE

## 2024-04-04 ENCOUNTER — HOSPITAL ENCOUNTER (OUTPATIENT)
Facility: HOSPITAL | Age: 74
Setting detail: HOSPITAL OUTPATIENT SURGERY
Discharge: HOME | End: 2024-04-04
Attending: INTERNAL MEDICINE | Admitting: INTERNAL MEDICINE
Payer: MEDICARE

## 2024-04-04 ENCOUNTER — ANESTHESIA EVENT (OUTPATIENT)
Dept: ENDOSCOPY | Facility: HOSPITAL | Age: 74
End: 2024-04-04
Payer: MEDICARE

## 2024-04-04 VITALS
SYSTOLIC BLOOD PRESSURE: 145 MMHG | DIASTOLIC BLOOD PRESSURE: 75 MMHG | BODY MASS INDEX: 48.32 KG/M2 | HEART RATE: 78 BPM | TEMPERATURE: 98 F | OXYGEN SATURATION: 100 % | RESPIRATION RATE: 16 BRPM | HEIGHT: 64 IN | WEIGHT: 283 LBS

## 2024-04-04 PROCEDURE — 88305 TISSUE EXAM BY PATHOLOGIST: CPT | Performed by: INTERNAL MEDICINE

## 2024-04-04 PROCEDURE — 0DB58ZX EXCISION OF ESOPHAGUS, VIA NATURAL OR ARTIFICIAL OPENING ENDOSCOPIC, DIAGNOSTIC: ICD-10-PCS | Performed by: INTERNAL MEDICINE

## 2024-04-04 PROCEDURE — 0DBB8ZX EXCISION OF ILEUM, VIA NATURAL OR ARTIFICIAL OPENING ENDOSCOPIC, DIAGNOSTIC: ICD-10-PCS | Performed by: INTERNAL MEDICINE

## 2024-04-04 PROCEDURE — 0DBE8ZX EXCISION OF LARGE INTESTINE, VIA NATURAL OR ARTIFICIAL OPENING ENDOSCOPIC, DIAGNOSTIC: ICD-10-PCS | Performed by: INTERNAL MEDICINE

## 2024-04-04 RX ORDER — PHENYLEPHRINE HCL 10 MG/ML
VIAL (ML) INJECTION AS NEEDED
Status: DISCONTINUED | OUTPATIENT
Start: 2024-04-04 | End: 2024-04-04 | Stop reason: SURG

## 2024-04-04 RX ORDER — SODIUM CHLORIDE, SODIUM LACTATE, POTASSIUM CHLORIDE, CALCIUM CHLORIDE 600; 310; 30; 20 MG/100ML; MG/100ML; MG/100ML; MG/100ML
INJECTION, SOLUTION INTRAVENOUS CONTINUOUS
Status: DISCONTINUED | OUTPATIENT
Start: 2024-04-04 | End: 2024-04-04

## 2024-04-04 RX ADMIN — SODIUM CHLORIDE, SODIUM LACTATE, POTASSIUM CHLORIDE, CALCIUM CHLORIDE: 600; 310; 30; 20 INJECTION, SOLUTION INTRAVENOUS at 08:20:00

## 2024-04-04 RX ADMIN — PHENYLEPHRINE HCL 100 MCG: 10 MG/ML VIAL (ML) INJECTION at 08:26:00

## 2024-04-04 NOTE — ANESTHESIA POSTPROCEDURE EVALUATION
[facility]    Sally Heath Patient Status:  Hospital Outpatient Surgery   Age/Gender 73 year old female MRN [id number]   Location [facility] ENDOSCOPY [facility] Attending Steven Mae MD   Hosp Day # 0 PCP Kelin Scott MD       Anesthesia Post-op Note    ESOPHAGOGASTRODUODENOSCOPY with Biopsy / COLONOSCOPY with Biopsy    Procedure Summary       Date: 04/04/24 Room / Location:  ENDOSCOPY 03 / [facility]    Anesthesia Start: 0820 Anesthesia Stop: 0858    Procedures:       ESOPHAGOGASTRODUODENOSCOPY with Biopsy / COLONOSCOPY with Biopsy      COLONOSCOPY Diagnosis: (EGD= Barretts esophagus, hiatal hernia              COLON=diverticulosis, hemorrhoids,)    Surgeons: Steven Mae MD Anesthesiologist: Carissa Baxter MD    Anesthesia Type: MAC ASA Status: 3            Anesthesia Type: MAC    Vitals Value Taken Time   /58 04/04/24 0901   Temp  04/04/24 0905   Pulse 74 04/04/24 0905   Resp 16 04/04/24 0900   SpO2 100 % 04/04/24 0905   Vitals shown include unfiled device data.    Patient Location: Endoscopy    Anesthesia Type: MAC    Airway Patency: patent    Postop Pain Control: adequate    Mental Status: preanesthetic baseline    Nausea/Vomiting: none    Cardiopulmonary/Hydration status: stable euvolemic    Complications: no apparent anesthesia related complications    Postop vital signs: stable    Dental Exam: Unchanged from Preop    Patient to be discharged from PACU when criteria met.

## 2024-04-04 NOTE — OPERATIVE REPORT
EGD/Colonoscopy Operative Report    Sally Heath Patient Status:  Hospital Outpatient Surgery    1950 MRN [id number]   Former[facility] Attending Steven Mae MD   Hosp Day #   0 PCP Kelin Scott MD     Pre-Operative Diagnosis: crohns disease of colon without complication, barretts esophagus    Post-Operative Diagnosis:    ESOPHAGUS:  C2M2 area of Acosta's esophagus biopsied.   STOMACH:  5 cm hiatal hernia   DUODENUM:  normal.   COLON:      1.  Normal terminal ileum.  Biopsied.    2.  Significant sigmoid diverticulosis.    3.  No signs of colitis.    4.  Surveillance biopsies taken throughout the colon.    5.  Hemorrhoids      Procedure Performed:   EGD with biopsy  COLONOSCOPY with biopsy  Informed Consent: Informed consent for both the procedure and sedation were obtained from the patient. The potentially life-threatening complications of sedation, bleeding,  perforation, transfusion or repeat endoscopy were reviewed along with the possible need for hospitalization, surgical management, transfusion or repeat endoscopy should one of these complications arise. The patient understands and is agreeable to proceed.  Sedation Type: MAC-Patient received sedation with monitored anesthesia provided by an anesthesiologist    Moderate Sedation Time: None.  Deep sedation provided by anesthesia.    Cecum Withdrawal Time:  11 min    Date of previous colonoscopy:     Procedure Description: The patient was placed in the left lateral decubitus position. A bite block was placed in the patient’s mouth. The endoscope was inserted through the mouth and advanced under direct visualization to the 3rd portion of the duodenum and was then withdrawn to examine the duodenal bulb and gastric antrum.  The endoscope was then retroflexed to examine the  angulus, GE junction, cardia, body and fundus,  then withdrawn to examine the esophagus. The endoscope was then removed from the patient. The patient tolerated the procedure well with no immediate complications. The patient was then repositioned for colonoscopy.  After careful digital rectal examination, the Adult colonoscope was inserted into the rectum and advanced to the level of the terminal ileum and cecum under direct visualization. The cecum was identified by landmarks, including the appendiceal orifice and ileoceccal valve. Careful examination of the entire colon was performed during withdrawal of the endoscope. The scope was withdrawn to the rectum and retroflexion was performed.  The patient tolerated the procedure well with no immediate complications. The patient was transferred to the recovery area in stable condition.   Quality of Preparation: Adequate  Aronchick Bowel Prep Scale:  1 - excellent    Findings:    ESOPHAGUS:  C2M2 area of Acosta's esophagus biopsied.   STOMACH:  5 cm hiatal hernia   DUODENUM:  normal.   COLON:      1.  Normal terminal ileum.  Biopsied.    2.  Significant sigmoid diverticulosis.    3.  No signs of colitis.    4.  Surveillance biopsies taken throughout the colon.    5.  Hemorrhoids      Recommendations:   Await pathology    Discharge:  The patient was given an after visit summary detailing the procedure, findings, recommendations and follow up plans.  Steven Mae MD  4/4/2024  8:02 AM

## 2024-04-04 NOTE — ANESTHESIA PREPROCEDURE EVALUATION
PRE-OP EVALUATION    Patient Name: Sally Heath    Admit Diagnosis: crohns disease of colon without complication, barretts    Pre-op Diagnosis: crohns disease of colon without complication, barretts    ESOPHAGOGASTRODUODENOSCOPY, COLONOSCOPY    Anesthesia Procedure: ESOPHAGOGASTRODUODENOSCOPY, COLONOSCOPY  COLONOSCOPY    Surgeon(s) and Role:     * Steven Mae MD - Primary    Pre-op vitals reviewed.  Temp: 97.6 °F (36.4 °C)  Pulse: 83  Resp: 16  BP: 154/71  SpO2: 93 %  Body mass index is 48.58 kg/m².    Current medications reviewed.  Hospital Medications:   lactated ringers infusion   Intravenous Continuous       Outpatient Medications:     Medications Prior to Admission   Medication Sig Dispense Refill Last Dose    Vibegron (GEMTESA) 75 MG Oral Tab Take 75 mg by mouth daily. 90 tablet 3     furosemide 20 MG Oral Tab Take 1 tablet (20 mg total) by mouth 2 (two) times daily.   4/3/2024    ALPRAZOLAM 0.25 MG Oral Tab TAKE 1 TABLET BY MOUTH EVERY DAY AS NEEDED FOR SLEEP 30 tablet 0 Past Week    valsartan 160 MG Oral Tab Take 1 tablet (160 mg total) by mouth daily. 90 tablet 3 4/4/2024    BIOTIN OR Take by mouth.   Past Week    Saccharomyces boulardii (FLORASTOR OR) daily.   Past Week    Multiple Vitamin Oral Tab Take 1 tablet by mouth daily.   Past Week    azathioprine 50 MG Oral Tab Take 2 tablets (100 mg total) by mouth daily.  2 4/3/2024    Pantoprazole Sodium 40 MG Oral Tab EC Take 1 tablet (40 mg total) by mouth every morning before breakfast.  0 Past Week    acetaminophen 500 MG Oral Tab Take 1 tablet (500 mg total) by mouth in the morning and 1 tablet (500 mg total) before bedtime.   4/3/2024    Calcium Carbonate-Vitamin D 600-200 MG-UNIT Oral Tab Take by mouth.   Past Week    aspirin 81 MG Oral Tab Take 1 tablet (81 mg total) by mouth daily.   4/3/2024    estradiol (ESTRACE) 0.1 MG/GM Vaginal Cream Apply 1/2 gram vaginally 2 times per week. 42 g 3        Allergies: Amoxicillin, Bactrim [sulfamethoxazole  w/trimethoprim], and Keflex [cephalexin]      Anesthesia Evaluation    Patient summary reviewed.    Anesthetic Complications           GI/Hepatic/Renal    Negative GI/hepatic/renal ROS.                    (+) Crohn's disease         Cardiovascular    Negative cardiovascular ROS.            (+) obesity  (+) hypertension                     (+) CHF                Endo/Other    Negative endo/other ROS.       (-) hypothyroidism                       Pulmonary    Negative pulmonary ROS.                       Neuro/Psych    Negative neuro/psych ROS.    (+) anxiety                              Past Surgical History:   Procedure Laterality Date    ADJUSTMENT GASTRIC BAND      BACK SURGERY      Triple spinal fusion     CHOLECYSTECTOMY      CYSTOSCOPY CHEMODENERVATION  05/08/2019    100U    CYSTOSCOPY CHEMODENERVATION  05/04/2021    100U Botox    CYSTOSCOPY CHEMODENERVATION  09/23/2020    100U    HERNIA SURGERY      OTHER SURGICAL HISTORY      Total knee arthroplasty bilateral     OTHER SURGICAL HISTORY      Carpal tunnel repair    OTHER SURGICAL HISTORY      Lap band     OTHER SURGICAL HISTORY      5/8/2019 - Botox     SPINE SURGERY PROCEDURE UNLISTED      LUMBAR FUSION    TOTAL KNEE REPLACEMENT       Social History     Socioeconomic History    Marital status: Single   Tobacco Use    Smoking status: Former    Smokeless tobacco: Never    Tobacco comments:     Quit mid 80's   Vaping Use    Vaping Use: Never used   Substance and Sexual Activity    Alcohol use: Not Currently     Comment: occassionally    Drug use: No     History   Drug Use No     Available pre-op labs reviewed.               Airway      Mallampati: II  Mouth opening: >3 FB  TM distance: 4 - 6 cm  Neck ROM: full Cardiovascular      Rhythm: regular  Rate: normal     Dental             Pulmonary    Pulmonary exam normal.                 Other findings              ASA: 3   Plan: MAC  NPO status verified and     Post-procedure pain management plan discussed with  surgeon and patient.      Plan/risks discussed with: patient and Other                Present on Admission:  **None**

## 2024-04-04 NOTE — H&P
History and Physical for Endoscopic Procedure      Sally Heath Patient Status:  Hospital Outpatient Surgery    1950 MRN [id number]   Location [facility] ENDOSCOPY [facility] Attending Steven Mae MD   Hosp Day # 0 PCP Kelin Scott MD     Date of Consult:  24    Reason for Consultation:  Acosta's Esophagus, Crohn's disease    History of Present Illness:  Sally Heath is a a(n) 73 year old female.     History:  Past Medical History:   Diagnosis Date    Anxiety state     Back problem     Acosta's esophagus     Blood disorder     Congestive heart disease (HCC)     Crohn disease (HCC)     Crohn's disease (HCC)     Foot drop, right     High blood pressure     High cholesterol     IBS (irritable bowel syndrome)     OAB (overactive bladder)     Obesity     Osteoarthritis     Visual impairment      Past Surgical History:   Procedure Laterality Date    ADJUSTMENT GASTRIC BAND      BACK SURGERY      Triple spinal fusion     CHOLECYSTECTOMY      CYSTOSCOPY CHEMODENERVATION  2019    100U    CYSTOSCOPY CHEMODENERVATION  2021    100U Botox    CYSTOSCOPY CHEMODENERVATION  2020    100U    HERNIA SURGERY      OTHER SURGICAL HISTORY      Total knee arthroplasty bilateral     OTHER SURGICAL HISTORY      Carpal tunnel repair    OTHER SURGICAL HISTORY      Lap band     OTHER SURGICAL HISTORY      2019 - Botox     SPINE SURGERY PROCEDURE UNLISTED      LUMBAR FUSION    TOTAL KNEE REPLACEMENT       Family History   Problem Relation Age of Onset    Diabetes Mother     Heart Disorder Mother     Thyroid Disorder Mother     Hypertension Mother     Other (Other) Mother     Hypertension Father     Other (Other) Father         TIA and COPD    Breast Cancer Maternal Aunt 80        80    Breast Cancer Maternal Cousin Female 38        Dx Breast CA age 38 and 58    Breast Cancer 2nd occurrence Maternal Cousin Female 45        45      reports that she has quit smoking. She has never used smokeless  tobacco. She reports that she does not currently use alcohol. She reports that she does not use drugs.    Allergies:  Allergies   Allergen Reactions    Amoxicillin HIVES     Thinks can take keflex    Bactrim [Sulfamethoxazole W/Trimethoprim] DIARRHEA    Keflex [Cephalexin] DIARRHEA       Medications:    Current Facility-Administered Medications:     lactated ringers infusion, , Intravenous, Continuous    Review of Systems:  Gastrointestinal: negative other than specified in the HPI  General: negative other than specified in the HPI  Neurological: negative other than specified in the HPI  Cardiovascular: negative other than specified in the HPI  Respiratory: negative other than specified in the HPI    Physical Exam:  No acute distress  RRR  CTA B/L  SOFT +BS    Assessment/Plan:  Patient Active Problem List   Diagnosis    Non-toxic nodular goiter    Hypercalcemia    Crohn's disease of small intestine without complication (HCC)    Essential hypertension    Morbid obesity with BMI of 40.0-44.9, adult (HCC)    Foot drop, unspecified foot    Thrombocytosis    Atherosclerosis of aorta (HCC)    Tortuous aorta (HCC)    OAB (overactive bladder)    Anxiety disorder    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    Steven Mae MD  4/4/2024  8:01 AM

## 2024-04-04 NOTE — DISCHARGE INSTRUCTIONS
FINDINGS:  1.  The Acosta's Esophagus is very small and this was biopsied.  2.  You have a moderate size hiatal hernia.  3.  No signs of active Crohn's disease and the usual biopsies were taken throughout the small and large colon.  4.  There were small pockets in the colon called diverticulosis.  These are considered normal to have.  So long as you have not had any complications with these pockets such as significant rectal bleeding or infections called diverticulitis, then there is nothing recommended that you need to do.      PLAN:  1.  Await all the biopsy results.      If you have any questions, please call us at [phone].    Thank you,  Steven Mae MD          Home Care Instructions for Colonoscopy and Gastroscopy with Sedation    Diet:  - Resume your regular diet as tolerated unless otherwise instructed.  - Start with light meals to minimize bloating.  - Do not drink alcohol today.    Medication:  - If you have questions about resuming your normal medications, please contact your Primary Care Physician.    Activities:  - Take it easy today. Do not return to work today.  - Do not drive today.  - Do not operate any machinery today (including kitchen equipment).    Colonoscopy:  - You may notice some rectal \"spotting\" (a little blood on the toilet tissue) for a day or two after the exam. This is normal.  - If you experience any rectal bleeding (not spotting), persistent tenderness or sharp severe abdominal pains, oral temperature over 100 degrees Fahrenheit, light-headedness or dizziness, or any other problems, contact your doctor.    Gastroscopy:  - You may have a sore throat for 2-3 days following the exam. This is normal. Gargling with warm salt water (1/2 tsp salt to 1 glass warm water) or using throat lozenges will help.  - If you experience any sharp pain in your neck, abdomen or chest, vomiting of blood, oral temperature over 100 degrees Fahrenheit, light-headedness or dizziness, or any other  problems, contact your doctor.    **If unable to reach your doctor, please go to the [facility] Emergency Room**    - Your referring physician will receive a full report of your examination.  - If you do not hear from your doctor's office within two weeks of your biopsy, please call them for your results.    You may be able to see your laboratory results in [facility] between 4 and 7 business days.  In some cases, your physician may not have viewed the results before they are released to [employer].  If you have questions regarding your results contact the physician who ordered the test/exam by phone or via [facility] by choosing \"Ask a Medical Question.\"

## 2025-04-11 ENCOUNTER — OFFICE VISIT (OUTPATIENT)
Dept: FAMILY MEDICINE CLINIC | Facility: CLINIC | Age: 75
End: 2025-04-11
Payer: MEDICARE

## 2025-04-11 VITALS
HEIGHT: 64 IN | HEART RATE: 86 BPM | OXYGEN SATURATION: 97 % | TEMPERATURE: 98 F | BODY MASS INDEX: 47.97 KG/M2 | DIASTOLIC BLOOD PRESSURE: 77 MMHG | SYSTOLIC BLOOD PRESSURE: 138 MMHG | RESPIRATION RATE: 18 BRPM | WEIGHT: 281 LBS

## 2025-04-11 DIAGNOSIS — T24.211A PARTIAL THICKNESS BURN OF RIGHT HIP, INITIAL ENCOUNTER: Primary | ICD-10-CM

## 2025-04-11 PROCEDURE — 99213 OFFICE O/P EST LOW 20 MIN: CPT | Performed by: NURSE PRACTITIONER

## 2025-04-11 PROCEDURE — 1160F RVW MEDS BY RX/DR IN RCRD: CPT | Performed by: NURSE PRACTITIONER

## 2025-04-11 PROCEDURE — 1159F MED LIST DOCD IN RCRD: CPT | Performed by: NURSE PRACTITIONER

## 2025-04-11 RX ORDER — SILVER SULFADIAZINE 10 MG/G
1 CREAM TOPICAL 2 TIMES DAILY
Qty: 25 G | Refills: 0 | Status: SHIPPED | OUTPATIENT
Start: 2025-04-11 | End: 2025-04-25
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
## 2025-04-11 NOTE — PATIENT INSTRUCTIONS
Clean the area gently with soap/water, pat dry, apply Silvadene and non stick pad to area twice daily for 14 days.  If unable to use Silvadene due to localized reaction please switch to Aquaphor ointment or Vaseline-thin layer is needed to keep the wound bed moist and allow healing  Increase protein intake to help with healing, also stay well hydrated. May take Ibuprofen for pain if not contraindicated.  Follow up with Dr. Scott if symptoms of increased pain, redness, swelling or streaking occur, seek urgent care if fever, vomiting, weakness occurs.

## (undated) DEVICE — KIT CUSTOM ENDOPROCEDURE STERIS

## (undated) DEVICE — Device

## (undated) DEVICE — 10FT COMBINED O2 DELIVERY/CO2 MONITORING. FILTER WITH MICROSTREAM TYPE LUER: Brand: DUAL ADULT NASAL CANNULA

## (undated) DEVICE — BITEBLOCK ENDOSCP 60FR MAXI STRP

## (undated) DEVICE — KIT VLV 5 PC AIR H2O SUCT BX ENDOGATOR CONN

## (undated) DEVICE — GIJAW SINGLE-USE BIOPSY FORCEPS WITH NEEDLE: Brand: GIJAW

## (undated) DEVICE — 3M™ RED DOT™ MONITORING ELECTRODE WITH FOAM TAPE AND STICKY GEL, 50/BAG, 20/CASE, 72/PLT 2570: Brand: RED DOT™

## (undated) DEVICE — 1200CC GUARDIAN II: Brand: GUARDIAN